# Patient Record
Sex: MALE | URBAN - METROPOLITAN AREA
[De-identification: names, ages, dates, MRNs, and addresses within clinical notes are randomized per-mention and may not be internally consistent; named-entity substitution may affect disease eponyms.]

---

## 2017-03-20 ENCOUNTER — APPOINTMENT (RX ONLY)
Dept: URBAN - METROPOLITAN AREA CLINIC 44 | Facility: CLINIC | Age: 52
Setting detail: DERMATOLOGY
End: 2017-03-20

## 2017-03-20 DIAGNOSIS — L72.8 OTHER FOLLICULAR CYSTS OF THE SKIN AND SUBCUTANEOUS TISSUE: ICD-10-CM

## 2017-03-20 PROBLEM — I10 ESSENTIAL (PRIMARY) HYPERTENSION: Status: ACTIVE | Noted: 2017-03-20

## 2017-03-20 PROBLEM — L29.8 OTHER PRURITUS: Status: ACTIVE | Noted: 2017-03-20

## 2017-03-20 PROCEDURE — 99202 OFFICE O/P NEW SF 15 MIN: CPT

## 2017-03-20 PROCEDURE — ? OTHER

## 2017-03-20 NOTE — PROCEDURE: OTHER
Note Text (......Xxx Chief Complaint.): This diagnosis correlates with the
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  Due to the close proximity of lateral canthus and lower eyelids I \\nrecommended that he see Dr. Pike, oculoplastics.
Detail Level: Zone